# Patient Record
Sex: MALE | Race: OTHER | Employment: FULL TIME | URBAN - METROPOLITAN AREA
[De-identification: names, ages, dates, MRNs, and addresses within clinical notes are randomized per-mention and may not be internally consistent; named-entity substitution may affect disease eponyms.]

---

## 2019-05-21 ENCOUNTER — HOSPITAL ENCOUNTER (EMERGENCY)
Age: 34
Discharge: HOME | End: 2019-05-21

## 2019-05-21 VITALS
BODY MASS INDEX: 31.5 KG/M2 | DIASTOLIC BLOOD PRESSURE: 98 MMHG | TEMPERATURE: 98.1 F | HEIGHT: 71 IN | SYSTOLIC BLOOD PRESSURE: 144 MMHG | OXYGEN SATURATION: 99 % | WEIGHT: 225 LBS | HEART RATE: 76 BPM | RESPIRATION RATE: 16 BRPM

## 2019-05-21 DIAGNOSIS — J30.2 SEASONAL ALLERGIC RHINITIS, UNSPECIFIED TRIGGER: Primary | ICD-10-CM

## 2019-05-21 PROCEDURE — 99282 EMERGENCY DEPT VISIT SF MDM: CPT

## 2019-05-21 RX ORDER — PSEUDOEPHEDRINE HCL 120 MG/1
120 TABLET, FILM COATED, EXTENDED RELEASE ORAL EVERY 12 HOURS
Qty: 14 TABLET | Refills: 0 | Status: SHIPPED | OUTPATIENT
Start: 2019-05-21 | End: 2019-05-28

## 2019-05-21 RX ORDER — FLUTICASONE PROPIONATE 50 MCG
1 SPRAY, SUSPENSION (ML) NASAL DAILY
Qty: 1 BOTTLE | Refills: 0 | Status: SHIPPED | OUTPATIENT
Start: 2019-05-21

## 2019-05-21 ASSESSMENT — ENCOUNTER SYMPTOMS
FACIAL SWELLING: 0
NAUSEA: 0
CONSTIPATION: 0
SINUS PRESSURE: 1
VOMITING: 0
TROUBLE SWALLOWING: 0
RHINORRHEA: 1
SHORTNESS OF BREATH: 0
SORE THROAT: 0
SINUS PAIN: 1
DIARRHEA: 0
ABDOMINAL PAIN: 0

## 2019-05-21 ASSESSMENT — PAIN DESCRIPTION - PAIN TYPE: TYPE: ACUTE PAIN

## 2019-05-21 ASSESSMENT — PAIN DESCRIPTION - DESCRIPTORS: DESCRIPTORS: ACHING

## 2019-05-21 ASSESSMENT — PAIN DESCRIPTION - LOCATION: LOCATION: GENERALIZED

## 2019-05-22 NOTE — ED PROVIDER NOTES
**EVALUATED BY YEVGENIY**    [address] [facility]  eMERGENCY dEPARTMENT eNCOUnter    Pt Name: Kary Parekh  MRN: [id number]  [place] 1985  Date of evaluation: 5/21/2019  Provider: TYRONE Rosenberg    Chief Complaint:    Chief Complaint   Patient presents with    Nasal Congestion     from seasonal allergies x 2 weeks       Nursing Notes, Past Medical Hx, Past Surgical Hx, Social Hx, Allergies, and Family Hx were all reviewed and agreedwith or any disagreements were addressed in the HPI.    HPI:  (Location, Duration, Timing, Severity, Quality, Assoc Sx, Context, Modifying factors)  This is a  34 y.o. male who presents to the emergency department with complaints of seasonal allergies for the past 2-3 weeks worsening over the past 24 hours. He states that his nose is so stopped at that he is unable to breathe out of it. He has been trying Allegra with no relief of symptoms. He complains of congestion and pressure in the sinuses bilaterally. Denies sore throat, cough, fevers or chills. No aggravating or alleviating factors. Denies any other complaints including chest pain shortness of breath fevers chills. All other systems were reviewed and are negative. Past Medical/Surgical History:  History reviewed. No pertinent past medical history. Procedure Laterality Date    FOREARM CLOSED REDUCTION      RIGHT       Medications:  Previous Medications    FEXOFENADINE HCL (ALLEGRA ALLERGY PO)    Take 2 tablets by mouth 2 times daily    IBUPROFEN (ADVIL;MOTRIN) 100 MG/5ML SUSPENSION    Take  by mouth every 4 hours as needed. \" I TOOK SOME LIQUID AT 11 OR NOON TODAY\"     IBUPROFEN (IBU) 800 MG TABLET    Take 1 tablet by mouth every 8 hours as needed for Pain. Review of Systems:  Review of Systems   Constitutional: Negative for chills and fever. HENT: Positive for congestion, rhinorrhea, sinus pressure and sinus pain.  Negative for ear discharge, facial swelling, nosebleeds, negative at this time or not returned at the time of this note. RADIOLOGY:   Non-plain film images suchas CT, Ultrasound and MRI are read by the radiologist. IRenata PA have directly visualized the radiologic plain film image(s) with the below findings:  Interpretation per the Radiologist below, if available at the time of this note:    No orders to display     [facility] / ED COURSE:      PROCEDURES:   Procedures    Patient was given:  Medications - No data to display  This is a  34 y.o. male who presents to the emergency department with complaints of seasonal allergies for the past 2-3 weeks worsening over the past 24 hours. He states that his nose is so stopped at that he is unable to breathe out of it. He has been trying Allegra with no relief of symptoms. He complains of congestion and pressure in the sinuses bilaterally. Denies sore throat, cough, fevers or chills. No aggravating or alleviating factors. Denies any other complaints including chest pain shortness of breath fevers chills. On exam today patient does have bilateral sinus congestion and sinus pressure bilaterally. Otherwise vitals are stable. Patient will be treated symptomatically for home prescribed Flonase and Sudafed. Referred to outpatient PCP. The patient tolerated their visit well. I have evaluated the patient with physician available for consultation as needed. I have discussed the findings of today's workup with the patient and addressed the patient's questions and concerns. Important warning signs as well as new or worsening symptoms which wouldnecessitate immediate return to the ED were discussed. The plan is to discharge from the ED at this time, and the patient is in stable condition. The patient acknowledged understanding is agreeable with this plan. CLINICAL IMPRESSION:  1.  Seasonal allergic rhinitis, unspecified trigger        DISPOSITION Decision To Discharge 05/21/2019 10:51:38